# Patient Record
Sex: MALE | Race: WHITE | HISPANIC OR LATINO | Employment: OTHER | URBAN - METROPOLITAN AREA
[De-identification: names, ages, dates, MRNs, and addresses within clinical notes are randomized per-mention and may not be internally consistent; named-entity substitution may affect disease eponyms.]

---

## 2024-01-04 ENCOUNTER — HOSPITAL ENCOUNTER (OUTPATIENT)
Dept: LAB | Facility: MEDICAL CENTER | Age: 64
End: 2024-01-04
Attending: STUDENT IN AN ORGANIZED HEALTH CARE EDUCATION/TRAINING PROGRAM
Payer: MEDICARE

## 2024-01-04 ENCOUNTER — OFFICE VISIT (OUTPATIENT)
Dept: MEDICAL GROUP | Facility: PHYSICIAN GROUP | Age: 64
End: 2024-01-04
Payer: MEDICARE

## 2024-01-04 VITALS
SYSTOLIC BLOOD PRESSURE: 160 MMHG | TEMPERATURE: 97.5 F | OXYGEN SATURATION: 97 % | HEART RATE: 60 BPM | DIASTOLIC BLOOD PRESSURE: 80 MMHG | BODY MASS INDEX: 31.83 KG/M2 | HEIGHT: 68 IN | WEIGHT: 210 LBS

## 2024-01-04 DIAGNOSIS — R03.0 ELEVATED BLOOD PRESSURE READING: ICD-10-CM

## 2024-01-04 DIAGNOSIS — M25.552 BILATERAL HIP PAIN: ICD-10-CM

## 2024-01-04 DIAGNOSIS — L72.3 SEBACEOUS CYST: ICD-10-CM

## 2024-01-04 DIAGNOSIS — Z76.89 ENCOUNTER TO ESTABLISH CARE: ICD-10-CM

## 2024-01-04 DIAGNOSIS — R03.0 WHITE COAT SYNDROME WITH HIGH BLOOD PRESSURE WITHOUT HYPERTENSION: ICD-10-CM

## 2024-01-04 DIAGNOSIS — R73.03 PREDIABETES: ICD-10-CM

## 2024-01-04 DIAGNOSIS — M25.551 BILATERAL HIP PAIN: ICD-10-CM

## 2024-01-04 DIAGNOSIS — Z11.59 NEED FOR HEPATITIS C SCREENING TEST: ICD-10-CM

## 2024-01-04 DIAGNOSIS — E78.2 MIXED HYPERLIPIDEMIA: ICD-10-CM

## 2024-01-04 DIAGNOSIS — Z12.11 COLON CANCER SCREENING: ICD-10-CM

## 2024-01-04 DIAGNOSIS — L71.9 ROSACEA: ICD-10-CM

## 2024-01-04 PROBLEM — M25.559 HIP PAIN: Status: ACTIVE | Noted: 2020-09-22

## 2024-01-04 PROBLEM — I10 REACTIVE HYPERTENSION: Status: ACTIVE | Noted: 2020-07-01

## 2024-01-04 PROBLEM — M25.562 CHRONIC PAIN OF BOTH KNEES: Status: ACTIVE | Noted: 2022-01-19

## 2024-01-04 PROBLEM — M25.561 CHRONIC PAIN OF BOTH KNEES: Status: ACTIVE | Noted: 2022-01-19

## 2024-01-04 PROBLEM — G89.29 CHRONIC PAIN OF BOTH KNEES: Status: ACTIVE | Noted: 2022-01-19

## 2024-01-04 LAB
ALBUMIN SERPL BCP-MCNC: 4.7 G/DL (ref 3.2–4.9)
ALBUMIN/GLOB SERPL: 1.6 G/DL
ALP SERPL-CCNC: 118 U/L (ref 30–99)
ALT SERPL-CCNC: 57 U/L (ref 2–50)
ANION GAP SERPL CALC-SCNC: 14 MMOL/L (ref 7–16)
AST SERPL-CCNC: 32 U/L (ref 12–45)
BILIRUB SERPL-MCNC: 0.6 MG/DL (ref 0.1–1.5)
BUN SERPL-MCNC: 12 MG/DL (ref 8–22)
CALCIUM ALBUM COR SERPL-MCNC: 8.9 MG/DL (ref 8.5–10.5)
CALCIUM SERPL-MCNC: 9.5 MG/DL (ref 8.5–10.5)
CHLORIDE SERPL-SCNC: 104 MMOL/L (ref 96–112)
CHOLEST SERPL-MCNC: 274 MG/DL (ref 100–199)
CO2 SERPL-SCNC: 25 MMOL/L (ref 20–33)
CREAT SERPL-MCNC: 1.05 MG/DL (ref 0.5–1.4)
EST. AVERAGE GLUCOSE BLD GHB EST-MCNC: 131 MG/DL
GFR SERPLBLD CREATININE-BSD FMLA CKD-EPI: 80 ML/MIN/1.73 M 2
GLOBULIN SER CALC-MCNC: 2.9 G/DL (ref 1.9–3.5)
GLUCOSE SERPL-MCNC: 113 MG/DL (ref 65–99)
HBA1C MFR BLD: 6.2 % (ref 4–5.6)
HCV AB SER QL: NORMAL
HDLC SERPL-MCNC: 59 MG/DL
LDLC SERPL CALC-MCNC: 195 MG/DL
POTASSIUM SERPL-SCNC: 4.4 MMOL/L (ref 3.6–5.5)
PROT SERPL-MCNC: 7.6 G/DL (ref 6–8.2)
SODIUM SERPL-SCNC: 143 MMOL/L (ref 135–145)
TRIGL SERPL-MCNC: 98 MG/DL (ref 0–149)

## 2024-01-04 PROCEDURE — 80053 COMPREHEN METABOLIC PANEL: CPT

## 2024-01-04 PROCEDURE — 3077F SYST BP >= 140 MM HG: CPT | Performed by: STUDENT IN AN ORGANIZED HEALTH CARE EDUCATION/TRAINING PROGRAM

## 2024-01-04 PROCEDURE — 83036 HEMOGLOBIN GLYCOSYLATED A1C: CPT | Mod: GZ

## 2024-01-04 PROCEDURE — 3079F DIAST BP 80-89 MM HG: CPT | Performed by: STUDENT IN AN ORGANIZED HEALTH CARE EDUCATION/TRAINING PROGRAM

## 2024-01-04 PROCEDURE — 99204 OFFICE O/P NEW MOD 45 MIN: CPT | Performed by: STUDENT IN AN ORGANIZED HEALTH CARE EDUCATION/TRAINING PROGRAM

## 2024-01-04 PROCEDURE — 80061 LIPID PANEL: CPT

## 2024-01-04 PROCEDURE — G0472 HEP C SCREEN HIGH RISK/OTHER: HCPCS

## 2024-01-04 PROCEDURE — 36415 COLL VENOUS BLD VENIPUNCTURE: CPT

## 2024-01-04 ASSESSMENT — ENCOUNTER SYMPTOMS
FEVER: 0
CHILLS: 0
PALPITATIONS: 0
SHORTNESS OF BREATH: 0

## 2024-01-04 ASSESSMENT — PATIENT HEALTH QUESTIONNAIRE - PHQ9: CLINICAL INTERPRETATION OF PHQ2 SCORE: 0

## 2024-01-04 NOTE — PROGRESS NOTES
"Subjective:     CC:  Diagnoses of Sebaceous cyst, Rosacea, Reactive hypertension, Prediabetes, Mixed hyperlipidemia, Bilateral hip pain, Colon cancer screening, Elevated blood pressure reading, and Need for hepatitis C screening test were pertinent to this visit.    HISTORY OF THE PRESENT ILLNESS: Patient is a 63 y.o. male. This pleasant patient is here today to establish care.    Patient presents today to establish care.  He moved from [place] two summers ago.    Patient reports he had has a history of a sebaceous cyst on his back that he has had removed twice previously.  He states that cysts is now returning and at times will be itchy.  He states that if the sebaceous cyst becomes more bothersome he would like a referral to dermatology.    He otherwise has no other acute questions or concerns today.    Patient Active Problem List    Diagnosis Date Noted    Chronic pain of both knees 01/19/2022    Sebaceous cyst 11/02/2021    Hip pain 09/22/2020    Reactive hypertension 07/01/2020    Rosacea 12/30/2014    Prediabetes 08/31/2012    Hyperlipidemia 10/06/2011       Health Maintenance: Completed  -Influenza vaccine was declined  -Hepatitis C labs ordered  -Shingrix and COVID recommended at pharmacy    ROS:   Review of Systems   Constitutional:  Negative for chills and fever.   Respiratory:  Negative for shortness of breath.    Cardiovascular:  Negative for chest pain and palpitations.         Objective:     Exam: BP (!) 160/80 (BP Location: Left arm, Patient Position: Sitting, BP Cuff Size: Adult)   Pulse 60   Temp 36.4 °C (97.5 °F) (Temporal)   Ht 1.727 m (5' 8\")   Wt 95.3 kg (210 lb)   SpO2 97%  Body mass index is 31.93 kg/m².    Physical Exam  Constitutional:       Appearance: Normal appearance.   Cardiovascular:      Rate and Rhythm: Normal rate and regular rhythm.   Pulmonary:      Effort: Pulmonary effort is normal. No respiratory distress.      Breath sounds: Normal breath sounds. No stridor. No " wheezing or rhonchi.   Skin:     Comments: 0.5 cm x 0.5 cm sebaceous cyst noted on mid back   Neurological:      Mental Status: He is alert.           Assessment & Plan:   63 y.o. male with the following -    1. Encounter to establish care  Patient presents today to establish care. Chart was reviewed and history was discussed in detail with the patient.    2. Elevated blood pressure reading  3. White coat syndrome with high blood pressure without hypertension  Blood pressure not at goal today.  Patient declined repeat blood pressure check.  Per patient he has no known history of hypertension reports having whitecoat syndrome.He states at home his systolic blood pressures range from 130-140.  Continue to monitor patient's blood pressure.  - Comp Metabolic Panel; Future    4. Prediabetes  Chronic, controlled condition.  A1c from 2021 was 5.7%.  Will repeat A1c today.  - HEMOGLOBIN A1C; Future    5. Mixed hyperlipidemia  Chronic, uncontrolled condition.  Lipid panel from 2021 with elevated total cholesterol and elevated LDL.  Will repeat lipid panel today and will calculate patient's ASCVD score.  - Lipid Profile; Future    6. Sebaceous cyst  Chronic, ongoing patient.  Patient reports he would like to monitor the sebaceous cyst and if it continues to become bothersome he will request a dermatology referral    7. Rosacea  Chronic, stable condition.    8. Bilateral hip pain  Chronic, stable condition.  Patient is status post bilateral hip replacement.    9. Colon cancer screening  - Referral to GI for Colonoscopy    10. Need for hepatitis C screening test  - HCV Scrn ( [id number] [employer]); Future        Return in about 4 weeks (around 2024) for Annual.    Please note that this dictation was created using voice recognition software. I have made every reasonable attempt to correct obvious errors, but I expect that there are errors of grammar and possibly content that I did not discover before finalizing the  note.

## 2024-02-20 ENCOUNTER — TELEPHONE (OUTPATIENT)
Dept: MEDICAL GROUP | Facility: PHYSICIAN GROUP | Age: 64
End: 2024-02-20
Payer: COMMERCIAL

## 2024-02-20 NOTE — LETTER
2/20/2024            Maynor Hendricks  [address] [place] [address]              Dear Maynor,    Your care is very important to us, and we have noticed that on 02/12/2024, you missed your appointment with Sparkle Jones M.D. at [facility]       We’re committed to providing you with the best care possible. Your appointment time is reserved for you and your provider to discuss any current or new health concerns and, together, determine the best plan of care for you. Please call [phone] to reschedule at your earliest convenience.        In some cases, [facility] offers additional resources to make your healthcare more accessible, including transportation assistance, financial assistance and virtual visits. To learn more about these resources, please call [phone].       In order to keep you as informed as possible, below is a brief summary of our policy regarding missed appointments:        If a patient “No Shows”??three (3) or more appointments within a rolling 12-month           period, they may be dismissed from the practice for failure to follow clinician      recommendations.     If you have any concerns regarding the care you are receiving, please talk with your provider or call the office at [phone] and request to speak with the Practice . We’re committed to providing excellent care, and your feedback is invaluable.          Sincerely,     Sparkle Jones M.D.

## 2024-03-21 ENCOUNTER — HOSPITAL ENCOUNTER (OUTPATIENT)
Dept: LAB | Facility: MEDICAL CENTER | Age: 64
End: 2024-03-21
Attending: STUDENT IN AN ORGANIZED HEALTH CARE EDUCATION/TRAINING PROGRAM
Payer: COMMERCIAL

## 2024-03-21 ENCOUNTER — OFFICE VISIT (OUTPATIENT)
Dept: MEDICAL GROUP | Facility: PHYSICIAN GROUP | Age: 64
End: 2024-03-21
Payer: MEDICARE

## 2024-03-21 VITALS
OXYGEN SATURATION: 95 % | TEMPERATURE: 98.3 F | WEIGHT: 208 LBS | HEIGHT: 68 IN | HEART RATE: 65 BPM | BODY MASS INDEX: 31.52 KG/M2 | SYSTOLIC BLOOD PRESSURE: 160 MMHG | DIASTOLIC BLOOD PRESSURE: 90 MMHG

## 2024-03-21 DIAGNOSIS — Z78.9 STATIN INTOLERANCE: ICD-10-CM

## 2024-03-21 DIAGNOSIS — E78.2 MIXED HYPERLIPIDEMIA: ICD-10-CM

## 2024-03-21 DIAGNOSIS — R73.03 PREDIABETES: ICD-10-CM

## 2024-03-21 DIAGNOSIS — R03.0 WHITE COAT SYNDROME WITH HIGH BLOOD PRESSURE WITHOUT HYPERTENSION: ICD-10-CM

## 2024-03-21 DIAGNOSIS — Z00.00 WELLNESS EXAMINATION: ICD-10-CM

## 2024-03-21 DIAGNOSIS — Z12.5 SCREENING FOR PROSTATE CANCER: ICD-10-CM

## 2024-03-21 PROCEDURE — 3077F SYST BP >= 140 MM HG: CPT | Performed by: STUDENT IN AN ORGANIZED HEALTH CARE EDUCATION/TRAINING PROGRAM

## 2024-03-21 PROCEDURE — 36415 COLL VENOUS BLD VENIPUNCTURE: CPT

## 2024-03-21 PROCEDURE — 99396 PREV VISIT EST AGE 40-64: CPT | Performed by: STUDENT IN AN ORGANIZED HEALTH CARE EDUCATION/TRAINING PROGRAM

## 2024-03-21 PROCEDURE — 84153 ASSAY OF PSA TOTAL: CPT | Mod: GA

## 2024-03-21 PROCEDURE — 3080F DIAST BP >= 90 MM HG: CPT | Performed by: STUDENT IN AN ORGANIZED HEALTH CARE EDUCATION/TRAINING PROGRAM

## 2024-03-21 PROCEDURE — 84154 ASSAY OF PSA FREE: CPT

## 2024-03-21 PROCEDURE — 99213 OFFICE O/P EST LOW 20 MIN: CPT | Mod: 25 | Performed by: STUDENT IN AN ORGANIZED HEALTH CARE EDUCATION/TRAINING PROGRAM

## 2024-03-21 NOTE — PROGRESS NOTES
Subjective:     CC:   Chief Complaint   Patient presents with    Annual Exam       HPI:   Maynor Hendricks is a 63 y.o. male who presents for an annual exam. He is feeling well and has no complaints.    Health Maintenance  Advanced directive: N/A  PT for falls prevention: N/A  Cholesterol Screening: Completed 1/2024, elevated total cholesterol and LDL  Diabetes Screening: Completed in 1/2024, with elevated A1c at 6.2%  AAA Screening: N/A  Aspirin Use: N/A    Anticipatory Guidance  Diet: Eats 1-2 meals a day. Eats a lot fast foods. Eats lots of chicken. Loves pasta and potatoes.   Exercise: He has not been working out as much, but has been working in the yard  Substance Abuse: N/A  Safe in relationship.   Seat belts, bike helmet, gun safety discussed.  Sun protection used.  Dental home.    Cancer screening  Colorectal Cancer Screening: Order previously provided   Lung Cancer Screening: N/A  Prostate Cancer Screening/PSA: Ordered today    Infectious disease screening/Immunizations  --STI Screening: Declined  --Practices safe sex.  --HIV Screening: Completed 7/2021 with negative findings  --Hepatitis C Screening: Completed 1/2024 with negative findings  --Immunizations:    Influenza: Declined   HPV:  N/A   Tetanus: Up-to-date, due next in 7/2030   Shingles: Declined   Pneumococcal : N/A   Hepatitis B: N/A  COVID-19: Declined  Other immunizations: N/A    He  has a past medical history of Prediabetes.  He  has a past surgical history that includes inguinal hernia laparoscopic (Left, 2022); hip arthroplasty mis total (Left, 2015); hip arthroplasty mis total (Right, 2021); and cholecystectomy.  Family History   Problem Relation Age of Onset    Arthritis Mother     Other Mother         Colitis    No Known Problems Sister     No Known Problems Sister     Heart Disease Maternal Grandmother     Heart Disease Maternal Grandfather     Stroke Maternal Grandfather     Alcohol abuse Maternal Grandfather     No Known Problems Paternal  "Grandmother     No Known Problems Paternal Grandfather      Social History     Tobacco Use    Smoking status: Never    Smokeless tobacco: Never   Vaping Use    Vaping Use: Never used   Substance Use Topics    Alcohol use: Not Currently    Drug use: Never       Patient Active Problem List    Diagnosis Date Noted    Statin intolerance 03/21/2024    Chronic pain of both knees 01/19/2022    Sebaceous cyst 11/02/2021    Hip pain 09/22/2020    Reactive hypertension 07/01/2020    Rosacea 12/30/2014    Prediabetes 08/31/2012    Hyperlipidemia 10/06/2011       No current outpatient medications on file.     No current facility-administered medications for this visit.    (including changes today)  Allergies: Food, Cheese, and Simvastatin    Review of Systems   Constitutional: Negative for fever, chills and malaise/fatigue.   HENT: Negative for congestion.    Eyes: Negative for pain.   Respiratory: Negative for cough and shortness of breath.    Cardiovascular: Negative for leg swelling.   Gastrointestinal: Negative for nausea, vomiting, abdominal pain and diarrhea.   Genitourinary: Negative for dysuria and hematuria.   Skin: Negative for rash.   Neurological: Negative for dizziness, focal weakness and headaches.   Endo/Heme/Allergies: Does not bleed easily.   Psychiatric/Behavioral: Negative for depression. The patient is not nervous/anxious.      Objective:     BP (!) 160/90   Pulse 65   Temp 36.8 °C (98.3 °F) (Temporal)   Ht 1.727 m (5' 8\")   Wt 94.3 kg (208 lb)   SpO2 95%   BMI 31.63 kg/m²   Body mass index is 31.63 kg/m².  Wt Readings from Last 4 Encounters:   03/21/24 94.3 kg (208 lb)   01/04/24 95.3 kg (210 lb)       Physical Exam:  Constitutional: Well-developed and well-nourished. Not diaphoretic. No distress.   Skin: Skin is warm and dry. No rash noted.  Head: Atraumatic without lesions.  Eyes: Conjunctivae and extraocular motions are normal. Pupils are equal, round, and reactive to light. No scleral icterus. "   Ears:  External ears unremarkable. Tympanic membranes clear and intact.  Nose: Nares patent. Septum midline. Turbinates without erythema nor edema. No discharge.   Mouth/Throat: Dentition is good. Tongue normal. Oropharynx is clear and moist. Posterior pharynx without erythema or exudates.  Neck: Supple, trachea midline. Normal range of motion. No thyromegaly present. No lymphadenopathy--cervical or supraclavicular.  Cardiovascular: Regular rate and rhythm, S1 and S2 without murmur, rubs, or gallops.    Lungs: Effort normal. Clear to auscultation throughout. No adventitious sounds. No CVA tenderness.  Abdomen: Soft, non tender, and without distention. Active bowel sounds in all four quadrants. No rebound, guarding, masses or HSM.  : Genitalia: Deferred  Rectal: deferred  Prostate: deferred  Extremities: No cyanosis, clubbing, erythema, nor edema. Distal pulses intact and symmetric.   Musculoskeletal: All major joints AROM full in all directions without pain.  Neurological: Alert and oriented x 3. DTRs 2+/3 and symmetric. No cranial nerve deficit. 5/5 myotomes. Sensation intact.  Psychiatric:  Behavior, mood, and affect are appropriate.      Assessment and Plan:     1. Wellness examination  Patient presents today for annual preventative wellness visit.  He was previously given referral for colonoscopy, he will schedule an appointment.  Annual lab work discussed.    2. White coat syndrome with high blood pressure without hypertension  Blood pressure not at goal today.  Patient reports ambulatory blood pressures range between 130-133/85.  Advised patient that if his blood pressures at home are above 140/90 we will need to start medication.  Patient verbalized understanding.    3. Mixed hyperlipidemia  4. Statin intolerance  Chronic, uncontrolled.  Total cholesterol and LDL elevated on lipid panel from 1/2024.  Patient has history of statin intolerance.  Patient to work on lifestyle modifications.  Will repeat lipid  panel again in 6 months.    5. Prediabetes  Chronic, ongoing condition.  A1c 6.2%.  Patient advised to make changes to diet and to engage in at least 30 minutes of physical activity daily.    6. Screening for prostate cancer  - PSA TOTAL + %FREE; Future      HCM: Completed.  Labs per orders.  Vaccinations per orders.  Counseling about diet, supplements, exercise, skin care and safe sex.      Follow-up: Return in about 6 months (around 9/21/2024) for Chronic Conditions.

## 2024-03-23 LAB
PSA FREE MFR SERPL: 14 %
PSA FREE SERPL-MCNC: 0.1 NG/ML
PSA SERPL-MCNC: 0.7 NG/ML (ref 0–4)

## 2024-03-28 NOTE — RESULT ENCOUNTER NOTE
Talked with patient this date and relayed the PSA results.  Pt. Questioned if he should proceed with his colonoscopy.  Discussed the difference in the 2 tests and he will schedule the colonoscopy.    Thank you,  Valeria Campos RN